# Patient Record
Sex: FEMALE | Race: WHITE
[De-identification: names, ages, dates, MRNs, and addresses within clinical notes are randomized per-mention and may not be internally consistent; named-entity substitution may affect disease eponyms.]

---

## 2017-01-31 ENCOUNTER — HOSPITAL ENCOUNTER (INPATIENT)
Dept: HOSPITAL 10 - OBT | Age: 32
LOS: 5 days | Discharge: HOME | End: 2017-02-05
Attending: OBSTETRICS & GYNECOLOGY | Admitting: OBSTETRICS & GYNECOLOGY
Payer: COMMERCIAL

## 2017-01-31 VITALS
HEIGHT: 63 IN | WEIGHT: 165.57 LBS | BODY MASS INDEX: 29.34 KG/M2 | BODY MASS INDEX: 29.34 KG/M2 | WEIGHT: 165.57 LBS | HEIGHT: 63 IN

## 2017-01-31 VITALS — HEART RATE: 86 BPM | RESPIRATION RATE: 18 BRPM | DIASTOLIC BLOOD PRESSURE: 60 MMHG | SYSTOLIC BLOOD PRESSURE: 109 MMHG

## 2017-01-31 DIAGNOSIS — Z3A.37: ICD-10-CM

## 2017-01-31 DIAGNOSIS — O36.5930: Primary | ICD-10-CM

## 2017-01-31 LAB
ADD UMIC: NO
ALBUMIN SERPL-MCNC: 3 G/DL (ref 3.3–4.9)
ALBUMIN/GLOB SERPL: 0.88 {RATIO}
ALP SERPL-CCNC: 153 IU/L (ref 42–121)
ALT SERPL-CCNC: 17 IU/L (ref 13–69)
ANION GAP SERPL CALC-SCNC: 13 MMOL/L (ref 8–16)
APTT BLD: 26.3 SEC (ref 25–35)
AST SERPL-CCNC: 16 IU/L (ref 15–46)
BARBITURATES UR-MCNC: NEGATIVE UG/ML
BASOPHILS # BLD AUTO: 0 10^3/UL (ref 0–0.1)
BASOPHILS NFR BLD: 0.3 % (ref 0–2)
BENZODIAZ UR-MCNC: NEGATIVE UG/L
BILIRUB DIRECT SERPL-MCNC: 0 MG/DL (ref 0–0.2)
BILIRUB SERPL-MCNC: 0.5 MG/DL (ref 0.2–1.3)
BUN SERPL-MCNC: 9 MG/DL (ref 7–20)
CALCIUM SERPL-MCNC: 9.1 MG/DL (ref 8.4–10.2)
CANNABINOIDS UR-MCNC: NEGATIVE UG/L
CHLORIDE SERPL-SCNC: 105 MMOL/L (ref 97–110)
CO2 SERPL-SCNC: 24 MMOL/L (ref 21–31)
COCAINE UR-MCNC: NEGATIVE NG/ML
COLOR UR: (no result)
CONDITION: 1
CREAT SERPL-MCNC: 0.52 MG/DL (ref 0.44–1)
EOSINOPHIL # BLD: 0.1 10^3/UL (ref 0–0.5)
EOSINOPHIL NFR BLD: 1.3 % (ref 0–7)
ERYTHROCYTE [DISTWIDTH] IN BLOOD BY AUTOMATED COUNT: 13.3 % (ref 11.5–14.5)
GLOBULIN SER-MCNC: 3.4 G/DL (ref 1.3–3.2)
GLUCOSE SERPL-MCNC: 108 MG/DL (ref 70–220)
GLUCOSE UR STRIP-MCNC: NEGATIVE %
HCT VFR BLD CALC: 33 % (ref 37–47)
HGB BLD-MCNC: 11.1 G/DL (ref 12–16)
INR PPP: 0.92
KETONES UR STRIP.AUTO-MCNC: NEGATIVE MG/DL
LYMPHOCYTES # BLD AUTO: 1.7 10^3/UL (ref 0.8–2.9)
LYMPHOCYTES NFR BLD AUTO: 16.4 % (ref 15–51)
MCH RBC QN AUTO: 30.5 PG (ref 29–33)
MCHC RBC AUTO-ENTMCNC: 33.7 G/DL (ref 32–37)
MCV RBC AUTO: 90.6 FL (ref 82–101)
MONOCYTES # BLD: 0.6 10^3/UL (ref 0.3–0.9)
MONOCYTES NFR BLD: 5.4 % (ref 0–11)
NEUTROPHILS # BLD: 8.1 10^3/UL (ref 1.6–7.5)
NEUTROPHILS NFR BLD AUTO: 76.6 % (ref 39–77)
NITRITE UR QL STRIP.AUTO: NEGATIVE
NRBC # BLD MANUAL: 0 10^3/UL (ref 0–0)
NRBC BLD QL: 0 /100WBC (ref 0–0)
OPIATES UR-MCNC: NEGATIVE NG/ML
PLATELET # BLD: 196 10^3/UL (ref 140–440)
PMV BLD AUTO: 10.1 FL (ref 7.4–10.4)
POTASSIUM SERPL-SCNC: 3.6 MMOL/L (ref 3.5–5.1)
PROT SERPL-MCNC: 6.4 G/DL (ref 6.1–8.1)
PROTHROMBIN TIME: 12.4 SEC (ref 12.2–14.2)
PT RATIO: 1
RBC # BLD AUTO: 3.64 10^6/UL (ref 4.2–5.4)
RBC # UR AUTO: NEGATIVE /UL
SODIUM SERPL-SCNC: 138 MMOL/L (ref 135–144)
URATE SERPL-MCNC: 5.5 MG/DL (ref 3.1–7.9)
URINE BILIRUBIN (DIP): NEGATIVE
URINE TOTAL PROTEIN (DIP): NEGATIVE
UROBILINOGEN UR STRIP-ACNC: (no result) (ref 0.1–1)
WBC # BLD AUTO: 10.6 10^3/UL (ref 4.8–10.8)
WBC # BLD: 10.6 10^3/UL (ref 4.8–10.8)
WBC # UR STRIP: NEGATIVE /UL

## 2017-01-31 PROCEDURE — 85610 PROTHROMBIN TIME: CPT

## 2017-01-31 PROCEDURE — 85730 THROMBOPLASTIN TIME PARTIAL: CPT

## 2017-01-31 PROCEDURE — 62319: CPT

## 2017-01-31 PROCEDURE — 86901 BLOOD TYPING SEROLOGIC RH(D): CPT

## 2017-01-31 PROCEDURE — 87340 HEPATITIS B SURFACE AG IA: CPT

## 2017-01-31 PROCEDURE — G0463 HOSPITAL OUTPT CLINIC VISIT: HCPCS

## 2017-01-31 PROCEDURE — 84560 ASSAY OF URINE/URIC ACID: CPT

## 2017-01-31 PROCEDURE — 90715 TDAP VACCINE 7 YRS/> IM: CPT

## 2017-01-31 PROCEDURE — 76818 FETAL BIOPHYS PROFILE W/NST: CPT

## 2017-01-31 PROCEDURE — 36415 COLL VENOUS BLD VENIPUNCTURE: CPT

## 2017-01-31 PROCEDURE — 81003 URINALYSIS AUTO W/O SCOPE: CPT

## 2017-01-31 PROCEDURE — 86900 BLOOD TYPING SEROLOGIC ABO: CPT

## 2017-01-31 PROCEDURE — 80307 DRUG TEST PRSMV CHEM ANLYZR: CPT

## 2017-01-31 PROCEDURE — 86592 SYPHILIS TEST NON-TREP QUAL: CPT

## 2017-01-31 PROCEDURE — 76815 OB US LIMITED FETUS(S): CPT

## 2017-01-31 PROCEDURE — 80053 COMPREHEN METABOLIC PANEL: CPT

## 2017-01-31 PROCEDURE — 85025 COMPLETE CBC W/AUTO DIFF WBC: CPT

## 2017-01-31 RX ADMIN — AMPICILLIN SODIUM SCH MLS/HR: 1 INJECTION, POWDER, FOR SOLUTION INTRAMUSCULAR; INTRAVENOUS at 21:01

## 2017-01-31 RX ADMIN — PYRIDOXINE HYDROCHLORIDE SCH MLS/HR: 100 INJECTION, SOLUTION INTRAMUSCULAR; INTRAVENOUS at 17:42

## 2017-01-31 RX ADMIN — PYRIDOXINE HYDROCHLORIDE SCH MLS/HR: 100 INJECTION, SOLUTION INTRAMUSCULAR; INTRAVENOUS at 22:42

## 2017-01-31 NOTE — RADRPT
PROCEDURE:   US OB biophysical profile. 

 

CLINICAL INDICATION:    decreased fetal movements, PIH 

 

TECHNIQUE:   Multiple sonographic images of the pelvis were obtained.  The images were reviewed on a
 PACS workstation. 

 

COMPARISON:   None

 

FINDINGS:

 

There is a single viable intrauterine gestation.  Cardiac activity is present with 144 beats per min
[place] Dehe. 

There is a vertex presentation. 

The placenta is anterior.   There is no evidence of placental abruption.

There is a decreased amount of amniotic fluid with an SHAYNA = 6.1 cm.

 

Biophysical profile:

Fetal movement 2/2

Fetal tone 2/2.

Fetal breathing 2/2 

SHAYNA 2/2

 

Total 8/8 

 

RPTAT: AA . 

 

IMPRESSION:

Normal biophysical profile.  

Mild oligohydramnios. .

_____________________________________________ 

.Yamil Snowden MD, MD           Date    Time 

Electronically viewed and signed by .Yamil Snowden MD, MD on 01/31/2017 15:58 

 

D:  01/31/2017 15:58  T:  01/31/2017 15:58

.S/

## 2017-01-31 NOTE — RADRPT
PROCEDURE:   US OB. 

 

CLINICAL INDICATION:    Size and dates  , PIH

 

TECHNIQUE:   Multiple sonographic images of the pelvis and gravid uterus were obtained.  The images 
were reviewed on a PACS workstation. 

 

COMPARISON:   No prior studies are available for comparison. 

 

FINDINGS:

 

There is a single viable intrauterine gestation.  Cardiac activity is present with 142 beats per min
lenin. 

There is a vertex presentation. 

The placenta is anterior. There is no evidence for an abruption or placenta previa.

 

 

Measurements were made in order to determine fetal age. The results are as follows:

BPD =8.3 cm

HC =31.1 cm

AC =30.1 cm

FL =7.0 cm

Estimated gestational age of approximately 34 weeks and 4 days based on ultrasound measurements.  

 

Clinical age:  37 weeks and 0 days.  

 

The estimated date of delivery is 3/10/17, based on ultrasound measurements.  

 

The EFW = 2458 g, 6.9%, based on LMP age.

 

RPTAT: AA

 

 

IMPRESSION:

Single viable intrauterine gestation of approximately  34 weeks and 4 days based on ultrasound measu
rements.

Smaller than clinical age by 2.5 weeks.

_____________________________________________ 

.Yamil Snowden MD, MD           Date    Time 

Electronically viewed and signed by .Yamil Snowden MD, MD on 01/31/2017 15:59 

 

D:  01/31/2017 15:59  T:  01/31/2017 15:59

.S/

## 2017-02-01 VITALS — HEART RATE: 76 BPM | SYSTOLIC BLOOD PRESSURE: 103 MMHG | DIASTOLIC BLOOD PRESSURE: 68 MMHG

## 2017-02-01 VITALS — HEART RATE: 77 BPM | DIASTOLIC BLOOD PRESSURE: 61 MMHG | SYSTOLIC BLOOD PRESSURE: 103 MMHG

## 2017-02-01 RX ADMIN — LEVOTHYROXINE SODIUM SCH MCG: 75 TABLET ORAL at 05:58

## 2017-02-01 RX ADMIN — AMPICILLIN SODIUM SCH MLS/HR: 1 INJECTION, POWDER, FOR SOLUTION INTRAMUSCULAR; INTRAVENOUS at 14:13

## 2017-02-01 RX ADMIN — AMPICILLIN SODIUM SCH MLS/HR: 1 INJECTION, POWDER, FOR SOLUTION INTRAMUSCULAR; INTRAVENOUS at 17:59

## 2017-02-01 RX ADMIN — AMPICILLIN SODIUM SCH MLS/HR: 1 INJECTION, POWDER, FOR SOLUTION INTRAMUSCULAR; INTRAVENOUS at 09:50

## 2017-02-01 RX ADMIN — PYRIDOXINE HYDROCHLORIDE SCH MLS/HR: 100 INJECTION, SOLUTION INTRAMUSCULAR; INTRAVENOUS at 14:13

## 2017-02-01 RX ADMIN — AMPICILLIN SODIUM SCH MLS/HR: 1 INJECTION, POWDER, FOR SOLUTION INTRAMUSCULAR; INTRAVENOUS at 01:01

## 2017-02-01 RX ADMIN — PYRIDOXINE HYDROCHLORIDE SCH MLS/HR: 100 INJECTION, SOLUTION INTRAMUSCULAR; INTRAVENOUS at 06:48

## 2017-02-01 RX ADMIN — AMPICILLIN SODIUM SCH MLS/HR: 1 INJECTION, POWDER, FOR SOLUTION INTRAMUSCULAR; INTRAVENOUS at 21:12

## 2017-02-01 RX ADMIN — AMPICILLIN SODIUM SCH MLS/HR: 1 INJECTION, POWDER, FOR SOLUTION INTRAMUSCULAR; INTRAVENOUS at 04:54

## 2017-02-02 RX ADMIN — AMPICILLIN SODIUM SCH MLS/HR: 1 INJECTION, POWDER, FOR SOLUTION INTRAMUSCULAR; INTRAVENOUS at 13:34

## 2017-02-02 RX ADMIN — Medication SCH MLS/HR: at 07:08

## 2017-02-02 RX ADMIN — AMPICILLIN SODIUM SCH MLS/HR: 1 INJECTION, POWDER, FOR SOLUTION INTRAMUSCULAR; INTRAVENOUS at 05:01

## 2017-02-02 RX ADMIN — PYRIDOXINE HYDROCHLORIDE SCH MLS/HR: 100 INJECTION, SOLUTION INTRAMUSCULAR; INTRAVENOUS at 00:25

## 2017-02-02 RX ADMIN — PYRIDOXINE HYDROCHLORIDE SCH MLS/HR: 100 INJECTION, SOLUTION INTRAMUSCULAR; INTRAVENOUS at 20:35

## 2017-02-02 RX ADMIN — AMPICILLIN SODIUM SCH MLS/HR: 1 INJECTION, POWDER, FOR SOLUTION INTRAMUSCULAR; INTRAVENOUS at 08:57

## 2017-02-02 RX ADMIN — AMPICILLIN SODIUM SCH MLS/HR: 1 INJECTION, POWDER, FOR SOLUTION INTRAMUSCULAR; INTRAVENOUS at 16:52

## 2017-02-02 RX ADMIN — AMPICILLIN SODIUM SCH MLS/HR: 1 INJECTION, POWDER, FOR SOLUTION INTRAMUSCULAR; INTRAVENOUS at 21:03

## 2017-02-02 RX ADMIN — PYRIDOXINE HYDROCHLORIDE SCH MLS/HR: 100 INJECTION, SOLUTION INTRAMUSCULAR; INTRAVENOUS at 09:39

## 2017-02-02 RX ADMIN — AMPICILLIN SODIUM SCH MLS/HR: 1 INJECTION, POWDER, FOR SOLUTION INTRAMUSCULAR; INTRAVENOUS at 01:11

## 2017-02-02 RX ADMIN — LEVOTHYROXINE SODIUM SCH MCG: 75 TABLET ORAL at 07:42

## 2017-02-03 VITALS — DIASTOLIC BLOOD PRESSURE: 61 MMHG | SYSTOLIC BLOOD PRESSURE: 109 MMHG | HEART RATE: 80 BPM | RESPIRATION RATE: 18 BRPM

## 2017-02-03 VITALS — DIASTOLIC BLOOD PRESSURE: 70 MMHG | RESPIRATION RATE: 16 BRPM | SYSTOLIC BLOOD PRESSURE: 111 MMHG | HEART RATE: 71 BPM

## 2017-02-03 VITALS — RESPIRATION RATE: 18 BRPM | SYSTOLIC BLOOD PRESSURE: 106 MMHG | DIASTOLIC BLOOD PRESSURE: 60 MMHG | HEART RATE: 78 BPM

## 2017-02-03 VITALS — RESPIRATION RATE: 16 BRPM | HEART RATE: 71 BPM | SYSTOLIC BLOOD PRESSURE: 111 MMHG | DIASTOLIC BLOOD PRESSURE: 70 MMHG

## 2017-02-03 VITALS — RESPIRATION RATE: 16 BRPM | DIASTOLIC BLOOD PRESSURE: 67 MMHG | HEART RATE: 71 BPM | SYSTOLIC BLOOD PRESSURE: 107 MMHG

## 2017-02-03 VITALS — SYSTOLIC BLOOD PRESSURE: 116 MMHG | DIASTOLIC BLOOD PRESSURE: 83 MMHG | RESPIRATION RATE: 16 BRPM | HEART RATE: 65 BPM

## 2017-02-03 PROCEDURE — 3E0P7GC INTRODUCTION OF OTHER THERAPEUTIC SUBSTANCE INTO FEMALE REPRODUCTIVE, VIA NATURAL OR ARTIFICIAL OPENING: ICD-10-PCS | Performed by: OBSTETRICS & GYNECOLOGY

## 2017-02-03 RX ADMIN — OXYCODONE HYDROCHLORIDE AND ASPIRIN PRN TAB: 4.8355; 325 TABLET ORAL at 08:16

## 2017-02-03 RX ADMIN — PYRIDOXINE HYDROCHLORIDE SCH MLS/HR: 100 INJECTION, SOLUTION INTRAMUSCULAR; INTRAVENOUS at 05:04

## 2017-02-03 RX ADMIN — DOCUSATE SODIUM AND SENNOSIDES SCH TAB: 8.6; 5 TABLET, FILM COATED ORAL at 08:15

## 2017-02-03 RX ADMIN — PYRIDOXINE HYDROCHLORIDE SCH MLS/HR: 100 INJECTION, SOLUTION INTRAMUSCULAR; INTRAVENOUS at 00:36

## 2017-02-03 RX ADMIN — IBUPROFEN SCH MG: 600 TABLET ORAL at 18:30

## 2017-02-03 RX ADMIN — DOCUSATE SODIUM AND SENNOSIDES SCH TAB: 8.6; 5 TABLET, FILM COATED ORAL at 21:15

## 2017-02-03 RX ADMIN — IBUPROFEN SCH MG: 600 TABLET ORAL at 12:04

## 2017-02-03 RX ADMIN — Medication SCH MLS/HR: at 02:10

## 2017-02-03 RX ADMIN — IBUPROFEN SCH MG: 600 TABLET ORAL at 05:50

## 2017-02-03 RX ADMIN — LEVOTHYROXINE SODIUM SCH MCG: 75 TABLET ORAL at 08:15

## 2017-02-03 RX ADMIN — PYRIDOXINE HYDROCHLORIDE SCH MLS/HR: 100 INJECTION, SOLUTION INTRAMUSCULAR; INTRAVENOUS at 10:16

## 2017-02-03 RX ADMIN — AMPICILLIN SODIUM SCH MLS/HR: 1 INJECTION, POWDER, FOR SOLUTION INTRAMUSCULAR; INTRAVENOUS at 01:26

## 2017-02-03 NOTE — HP
Date/Time of Note


Date/Time of Note


DATE: 2/3/17 


TIME: 17:25





OB - History


Hx of Present Pregnancy


Free Text/Dictation


This is a 31 years old  female  4 para 3 EDC  

admitted to [facility] at 37 weeks and 6 days for induction 

of labor due to suspected IUGR(fundal height 34 cm consistent with 34 weeks 

gestation) this patient has been under the care of [facility] and according to her prenatal record she has been noncompliant with her 

prenatal visits, testing& perinatology appointments


. Denies allergy to latex or any known medication


Background: Blood type O positive GBS unknown hepatitis B negative RPR negative 

HIV negative.


Drug/alcohol use, negative  cocaine/crack use negative marijuana negative


Medical history positive for kidney disease/UTI positive, depression mainly 

postpartum.


Physical exam: 5 feet 3 166 pounds temperature 98.8 pulse 78 respiration 20 

blood pressure 108/64


Head ears nose and throat negative


Neck, supple no thyromegaly


Lungs clear to P/a


Heart, normal saline nasal rhythm no murmur


Abdomen, fundal height 34 cm from symphysis pubis(small for gestational age) 

fetal heart tracing reassuring


Pelvic exam on admission cervical dilatation 0 effacement 50% vertex at -2 

station


Extremity no edema no varicosities


Impression intrauterine pregnancy at 37-1/2 week pregnancy suspected IUGR ,


plan induction of labor with Cervidil.


Biophysical profile, estimated fetal weight


Perinatology, neonatology consult


Estimated Due Date:  2017


:  4


Para:  3


Prenatal Care:  Limited Care


Ultrasounds:  Other (ultrasound at 14 weeks)


Obstetrical Complications:  Fetal Growth Restriction


Medical Complications:  None





Past Family/Social History


*


Past Medical, Surgical, Family and Obstetric Histories reviewed from prenatal 

chart.


Rubella:  immune


RPR/VDRL:  Negative


GBS Status:  Negative


HBsAG:  Unknown





OB  Admission Exam


Vital Signs


Vital Signs





 Vital Signs








  Date Time  Temp Pulse Resp B/P Pulse Ox O2 Delivery O2 Flow Rate FiO2


 


2/3/17 16:00 98.2 71 16 111/70  Room Air  











Physical Exam


HEENT:  WNL


Heart:  Rhythm Normal


Lungs:  Clear, Equal


Abdomen:  WNL


Extremities:  Normal


Reflexes:  Normal


Cervical Dilatation:  3cm


Effacement:  50%


Fetal Heart Rate:  130's


Accelerations:  Accelerations Present


Varibility:  Moderate


Contractions on Admission:  None











SUNNI SHI MD Feb 3, 2017 17:47

## 2017-02-04 VITALS — DIASTOLIC BLOOD PRESSURE: 57 MMHG | SYSTOLIC BLOOD PRESSURE: 91 MMHG | HEART RATE: 64 BPM

## 2017-02-04 VITALS — RESPIRATION RATE: 18 BRPM | HEART RATE: 63 BPM | SYSTOLIC BLOOD PRESSURE: 109 MMHG | DIASTOLIC BLOOD PRESSURE: 76 MMHG

## 2017-02-04 VITALS — DIASTOLIC BLOOD PRESSURE: 70 MMHG | SYSTOLIC BLOOD PRESSURE: 105 MMHG | RESPIRATION RATE: 18 BRPM | HEART RATE: 62 BPM

## 2017-02-04 VITALS — HEART RATE: 77 BPM | SYSTOLIC BLOOD PRESSURE: 111 MMHG | DIASTOLIC BLOOD PRESSURE: 67 MMHG | RESPIRATION RATE: 19 BRPM

## 2017-02-04 LAB
BASOPHILS # BLD AUTO: 0 10^3/UL (ref 0–0.1)
BASOPHILS NFR BLD: 0.4 % (ref 0–2)
EOSINOPHIL # BLD: 0.2 10^3/UL (ref 0–0.5)
EOSINOPHIL NFR BLD: 2.4 % (ref 0–7)
ERYTHROCYTE [DISTWIDTH] IN BLOOD BY AUTOMATED COUNT: 12.9 % (ref 11.5–14.5)
HCT VFR BLD CALC: 28.5 % (ref 37–47)
HGB BLD-MCNC: 9.8 G/DL (ref 12–16)
LYMPHOCYTES # BLD AUTO: 2.9 10^3/UL (ref 0.8–2.9)
LYMPHOCYTES NFR BLD AUTO: 31.3 % (ref 15–51)
MCH RBC QN AUTO: 30.4 PG (ref 29–33)
MCHC RBC AUTO-ENTMCNC: 34.4 G/DL (ref 32–37)
MCV RBC AUTO: 88.5 FL (ref 82–101)
MONOCYTES # BLD: 0.7 10^3/UL (ref 0.3–0.9)
MONOCYTES NFR BLD: 7.5 % (ref 0–11)
NEUTROPHILS # BLD: 5.4 10^3/UL (ref 1.6–7.5)
NEUTROPHILS NFR BLD AUTO: 58 % (ref 39–77)
NRBC # BLD MANUAL: 0 10^3/UL (ref 0–0)
NRBC BLD QL: 0 /100WBC (ref 0–0)
PLATELET # BLD: 165 10^3/UL (ref 140–440)
PMV BLD AUTO: 11.8 FL (ref 7.4–10.4)
RBC # BLD AUTO: 3.22 10^6/UL (ref 4.2–5.4)
WBC # BLD AUTO: 9.3 10^3/UL (ref 4.8–10.8)

## 2017-02-04 RX ADMIN — DOCUSATE SODIUM AND SENNOSIDES SCH TAB: 8.6; 5 TABLET, FILM COATED ORAL at 20:38

## 2017-02-04 RX ADMIN — LEVOTHYROXINE SODIUM SCH MCG: 75 TABLET ORAL at 06:03

## 2017-02-04 RX ADMIN — IBUPROFEN SCH MG: 600 TABLET ORAL at 00:00

## 2017-02-04 RX ADMIN — OXYCODONE HYDROCHLORIDE AND ASPIRIN PRN TAB: 4.8355; 325 TABLET ORAL at 16:47

## 2017-02-04 RX ADMIN — IBUPROFEN SCH MG: 600 TABLET ORAL at 17:51

## 2017-02-04 RX ADMIN — IBUPROFEN SCH MG: 600 TABLET ORAL at 12:00

## 2017-02-04 RX ADMIN — DOCUSATE SODIUM AND SENNOSIDES SCH TAB: 8.6; 5 TABLET, FILM COATED ORAL at 09:00

## 2017-02-04 RX ADMIN — IBUPROFEN SCH MG: 600 TABLET ORAL at 06:02

## 2017-02-04 NOTE — PN
Date/Time of Note


Date/Time of Note


DATE: 2/4/17 


TIME: 10:34





OB Subjective


Subjective


Subjective


Postpartum day 1


Afebrile vital sign a stable, abdomen soft uterus firm lochia normal limits he 

normal.


 Laboratory Tests








Test


  2/4/17


06:40


 


Basophils # 0.010^3/ul 


 


Basophils % 0.4% 


 


Eosinophils # 0.210^3/ul 


 


Eosinophils % 2.4% 


 


Hematocrit 28.5% 


 


Hemoglobin 9.8g/dl 


 


Lymphocytes # 2.910^3/ul 


 


Lymphocytes % 31.3% 


 


Mean Corpuscular Hemoglobin 30.4pg 


 


Mean Corpuscular Hemoglobin


Concent 34.4g/dl 


 


 


Mean Corpuscular Volume 88.5fl 


 


Mean Platelet Volume 11.8fl 


 


Monocytes # 0.710^3/ul 


 


Monocytes % 7.5% 


 


Neutrophils # 5.410^3/ul 


 


Neutrophils % 58.0% 


 


Nucleated Red Blood Cells # 0.010^3/ul 


 


Nucleated Red Blood Cells % 0.0/100WBC 


 


Platelet Count 89691^3/UL 


 


Red Blood Count 3.2210^6/ul 


 


Red Cell Distribution Width 12.9% 


 


White Blood Count 9.310^3/ul 








 Current Medications








 Medications


  (Trade)  Dose


 Ordered  Sig/Tuan


 Route


 PRN Reason  Start Time


 Stop Time Status Last Admin


Dose Admin


 


 Lactated Ringer's  1,000 ml @ 


 125 mls/hr  Q8H


 IV


   1/31/17 16:54


 2/3/17 05:04 DC 2/3/17 00:36


 


 


 Ampicillin  100 ml @ 


 100 mls/hr  ONCE  ONCE


 IV


   1/31/17 17:00


 1/31/17 17:59 DC 1/31/17 17:46


 


 


 Ampicillin


  (Ampicillin 1 Gm/


 NS (Pmx))  50 ml @ 


 100 mls/hr  Q4H


 IV


   1/31/17 21:00


 2/3/17 05:04 DC 2/3/17 01:26


 


 


 Dinoprostone


  (Cervidil


 Vaginal Supp)  10 mg  ONCE  ONCE


 VAG


   1/31/17 17:00


 1/31/17 17:21 DC  


 


 


 Butorphanol


 Tartrate


  (Stadol)  1 mg  Q2H  PRN


 IV


 PAIN  1/31/17 17:00


 2/3/17 05:04 DC  


 


 


 Butorphanol


 Tartrate


  (Stadol)  2 mg  Q2H  PRN


 IV


 PAIN  1/31/17 17:00


 2/3/17 05:05 DC  


 


 


 Lidocaine


  (Xylocaine 1%


  (Mpf))  30 ml  ONCE PRN


 INJ


 EPISIOTOMY/TEARING  1/31/17 17:00


 2/3/17 05:04 DC  


 


 


 Ibuprofen


  (Motrin)  600 mg  ONCE PRN


 PO


 Mild Pain (Pain Score 1-3)  1/31/17 17:00


 2/3/17 05:05 DC  


 


 


 Acetaminophen/


 Codeine Phosphate


 2 tab  2 tab  ONCE PRN


 PO


 Moderate to Severe Pain (4-10)  1/31/17 17:00


 2/3/17 05:05 DC  


 


 


 Lactated Ringer's  1,000 ml @ 


 2,000 mls/hr  Q30M PRN


 IV


 PRE-EPIDURAL BOLUS  1/31/17 17:00


 2/3/17 05:05 DC  


 


 


 Oxytocin/Lactated


 Ringer's  500 ml @ 0


 mls/hr  ONCE PRN


 IV


 For Hemorrhage Management  1/31/17 17:00


 2/3/17 05:04 DC  


 


 


 Methylergonovine


 Maleate


  (Methergine)  0.2 mg  ONCE PRN


 IM


 VAGINAL BLEEDING  1/31/17 17:00


 2/3/17 05:05 DC  


 


 


 Carboprost


 Tromethamine


  (Hemabate)  250 mcg  ONCE PRN


 IM


 VAGINAL BLEEDING  1/31/17 17:00


 2/3/17 05:05 DC  


 


 


 Misoprostol


  (Cytotec)  1,000 mcg  ONCE PRN


 FL


 VAGINAL BLEEDING  1/31/17 17:00


 2/3/17 05:04 DC  


 


 


 Levothyroxine


 Sodium


  (Synthroid)  225 mcg  DAILY@06


 PO


   2/1/17 06:00


 2/3/17 05:04 DC 2/2/17 07:42


 


 


 Dinoprostone 10 mg  10 mg  ONCE  ONCE


 VAG


   2/1/17 10:00


 2/1/17 10:01 DC 2/1/17 10:18


 


 


 Oxytocin/Lactated


 Ringer's  500 ml @ 0


 mls/hr  Q0M


 IV


   2/2/17 06:50


 2/3/17 05:04 DC 2/3/17 02:10


 


 


 Dinoprostone 10 mg  10 mg  ONCE  ONCE


 VAG


   2/2/17 13:30


 2/2/17 13:31 DC 2/2/17 13:35


 


 


 Fentanyl/


 Ropivacaine  100 ml @   STK-MED ONCE


 .ROUTE


   2/3/17 00:51


 2/3/17 00:52 DC  


 


 


 Oxytocin/Lactated


 Ringer's  500 ml @ 


 125 mls/hr  Q4H


 IV


   2/3/17 05:04


 2/3/17 10:44 DC 2/3/17 05:59


 


 


 Lactated Ringer's


  (Lr)  1,000 ml @ 


 125 mls/hr  Q8H


 IV*


   2/3/17 05:04


 2/3/17 13:00 DC 2/3/17 10:16


 


 


 Ibuprofen


  (Motrin)  600 mg  Q6


 PO


   2/3/17 06:00


    2/4/17 06:02


 


 


 Acetaminophen


  (Tylenol Tab)  650 mg  Q4H  PRN


 PO


 PAIN LEVEL 1-5  2/3/17 05:30


     


 


 


 Acetaminophen/


 Codeine Phosphate


  (Tylenol No.3)  1 tab  Q4H  PRN


 PO


 PAIN LEVEL 1-5  2/3/17 05:30


     


 


 


 Acetaminophen/


 Codeine Phosphate


  (Tylenol No.3)  2 tab  Q4H  PRN


 PO


 PAIN LEVEL 6-10  2/3/17 05:30


     


 


 


 Oxycodone/Aspirin


  (Percodan)  1 tab  Q3H  PRN


 PO


 PAIN LEVEL 1-5  2/3/17 05:30


    2/3/17 08:16


 


 


 Zolpidem Tartrate


  (Ambien)  5 mg  QHS  PRN


 PO


 INSOMNIA  2/3/17 05:30


     


 


 


 Senna/Docusate


 Sodium


  (Senokot-S)  1 tab  BID


 PO


   2/3/17 09:00


    2/3/17 21:15


 


 


 Witch Hazel/


 Glycerin


  (Tucks Pads)  1 pad  BEDSIDE MEDICATION  PRN


 FL


 HEMORRHOID/EPISIOTMY PAIN  2/3/17 05:30


    2/3/17 08:23


 


 


 Benzocaine


  (Dermoplast


 Spray)  1 spray  BEDSIDE MEDICATION  PRN


 TOP


 HEMORRHOID/EPISIOTMY PAIN  2/3/17 05:30


    2/3/17 08:22


 


 


 Lanolin


  (John-O-Soothe)  1 applic  BEDSIDE MEDICATION  PRN


 TOP


 BEDSIDE FOR LUCIUS TO NIPPLES  2/3/17 05:30


     


 


 


 Diphtheria/


 Tetanus/Acell


 Pertussis 0.5 ml  0.5 ml  ONCE ONCE


 IM*


   2/5/17 09:00


 2/5/17 09:01   


 


 


 Oxytocin/Lactated


 Ringer's  500 ml @ 0


 mls/hr  ONCE PRN


 IV


 For Hemorrhage Management  2/3/17 05:30


     


 


 


 Methylergonovine


 Maleate


  (Methergine)  0.2 mg  ONCE PRN


 IM


 VAGINAL BLEEDING  2/3/17 05:30


     


 


 


 Carboprost


 Tromethamine


  (Hemabate)  250 mcg  ONCE PRN


 IM


 VAGINAL BLEEDING  2/3/17 05:30


     


 


 


 Misoprostol


  (Cytotec)  1,000 mcg  ONCE PRN


 FL


 VAGINAL BLEEDING  2/3/17 05:30


     


 


 


 Levothyroxine


 Sodium


  (Synthroid)  225 mcg  DAILY@06


 PO


   2/3/17 06:00


    2/4/17 06:03


 

















SUNNI SHI MD Feb 4, 2017 10:35

## 2017-02-05 VITALS — SYSTOLIC BLOOD PRESSURE: 108 MMHG | HEART RATE: 59 BPM | RESPIRATION RATE: 14 BRPM | DIASTOLIC BLOOD PRESSURE: 71 MMHG

## 2017-02-05 VITALS — DIASTOLIC BLOOD PRESSURE: 59 MMHG | HEART RATE: 74 BPM | RESPIRATION RATE: 18 BRPM | SYSTOLIC BLOOD PRESSURE: 114 MMHG

## 2017-02-05 VITALS — DIASTOLIC BLOOD PRESSURE: 84 MMHG | RESPIRATION RATE: 16 BRPM | HEART RATE: 64 BPM | SYSTOLIC BLOOD PRESSURE: 132 MMHG

## 2017-02-05 RX ADMIN — IBUPROFEN SCH MG: 600 TABLET ORAL at 00:14

## 2017-02-05 RX ADMIN — LEVOTHYROXINE SODIUM SCH MCG: 75 TABLET ORAL at 05:36

## 2017-02-05 RX ADMIN — IBUPROFEN SCH MG: 600 TABLET ORAL at 12:00

## 2017-02-05 RX ADMIN — IBUPROFEN SCH MG: 600 TABLET ORAL at 05:36

## 2017-02-05 RX ADMIN — DOCUSATE SODIUM AND SENNOSIDES SCH TAB: 8.6; 5 TABLET, FILM COATED ORAL at 09:00

## 2017-02-05 NOTE — DS
Date/Time of Note


Date/Time of Note


DATE: 2/5/17 


TIME: 12:35





Obstetrical Discharge Record


Final Diagnosis


Final Diagnosis:  Term delivered





Vaginal Delivery


Obstetrical Delivery:  Spontaneous





Condition on Discharge


Physical Assessment


Last Vitals:


Vital sign stable, afebrile, abdomen soft, uterus firm, lochia normal, 

extremity normal, patient discharged home with postpartum instruction 

recommended appointment in 2 weeks


Voiding:  Yes


Bowel Movement:  Yes


Fundus:  Firm


Calf Tenderness:  No


Patient Condition:  Good











SUNNI SHI MD Feb 5, 2017 12:38

## 2017-02-05 NOTE — PD.PPDC
OB/GYN Discharge Instruction


Condition


Patient Condition:  Good





Diet


Diet:  Resume Regular Diet





Activity/Restrictions








Activity:   Normal Activity





 May Shower














Restrictions:   No Exercising





 No Lifting





 No Driving





 No Sexual Activity





 Nothing in the Vagina





 No Isle





 No Tampons, douche











Wound/Drain Care Instructions








Wound/Drain Care Instructions:   Wash with soap and water





 Keep clean and dry











Follow-up


Follow-up with Physician:  2





Return to clinic for








GYN Instructions:   Fever greater than 101





 Worsening abdominal pain





 Excessive Vaginal Bleeding





 More than 2 pads per hour





 Unable to tolerate diet

















SUNNI SHI MD Feb 5, 2017 12:35

## 2019-03-21 ENCOUNTER — HOSPITAL ENCOUNTER (EMERGENCY)
Dept: HOSPITAL 91 - FTE | Age: 34
Discharge: HOME | End: 2019-03-21
Payer: MEDICAID

## 2019-03-21 DIAGNOSIS — R05: Primary | ICD-10-CM

## 2019-03-21 DIAGNOSIS — R50.9: ICD-10-CM

## 2019-03-21 PROCEDURE — 99283 EMERGENCY DEPT VISIT LOW MDM: CPT

## 2019-03-21 NOTE — ERD
ER Documentation


Chief Complaint


Chief Complaint





Complains of cough, colds and flu symptoms x 3 days





ROS


All systems reviewed and are negative except as per history of present illness.





Medications


Home Meds


Active Scripts


Oseltamivir Phosphate* (Tamiflu*) 75 Mg Capsule, 75 MG PO BID for flu for 5 


Days, #10 CAP


   Prov:RED CARDENAS DO         3/21/19


Guaifenesin* (Robitussin*) 100 Mg/5 Ml Syrup, 200 MG PO Q6H PRN for COUGH for 7 


Days, #1 BOTTLE


   Prov:RED CARDENAS DO         3/21/19


Acetaminophen* (Acetaminophen*) 500 MG Extra Strength Tablet, 500 MG PO Q4H PRN 


for PAIN AND OR ELEVATED TEMP, #30 TAB


   Prov:RED CARDENAS DO         3/21/19


Famotidine* (Pepcid*) 20 Mg Tablet, 20 MG PO BID for 10 Days, #20 TAB


   Prov:ESME,KYUNG         8/29/18


Naproxen* (Naprosyn*) 500 Mg Tablet, 500 MG PO BID PRN for PAIN AND/OR 


INFLAMMATION, #20 TAB


   Prov:ESME,KYUNG         8/29/18


Reported Medications


Ferrous Sulfate (Iron) 134 Mg Tablet, 134 MG PO DAILY, TAB


   12/9/16


Prenatal Vit W-Ca,Fe,FA(<1 mg) (Prenatal Vitamins) 1 Each Tablet, 1 EACH PO 


DAILY, TAB


   12/9/16


Levothyroxine Sodium* (Synthroid*) 200 Mcg Tablet, 225 MCG PO BEFORE BREAKFAST, 


#30 TAB


   12/9/16





Allergies


Allergies:  


Coded Allergies:  


     No Known Allergy (Verified , 1/31/17)





PMhx/Soc


History of Surgery:  No


Anesthesia Reaction:  No


Hx Neurological Disorder:  No


Hx Respiratory Disorders:  No


Hx Cardiac Disorders:  No


Hx Psychiatric Problems:  No


Hx Miscellaneous Medical Probl:  Yes (pituatary tumor, thyroid)


Hx Alcohol Use:  Yes


Hx Substance Use:  No


Hx Tobacco Use:  No





Physical Exam


Vitals





Vital Signs


  Date      Temp  Pulse  Resp  B/P (MAP)   Pulse Ox  O2          O2 Flow    FiO2


Time                                                 Delivery    Rate


   3/21/19  98.5    102    20      110/58        95


     13:27                           (75)





Physical Exam


Const:   No acute distress


Head:   Atraumatic 


Eyes:    Normal Conjunctiva


ENT:    Normal External Ears, Nose and Mouth.


Neck:               Full range of motion. No meningismus.


Resp:   Clear to auscultation bilaterally


Cardio:   Regular rate and rhythm, no murmurs


Abd:    Soft, non tender, non distended. Normal bowel sounds


Skin:   No petechiae or rashes


Back:   No midline or flank tenderness


Ext:    No cyanosis, or edema


Neur:   Awake and alert


Psych:    Normal Mood and Affect





Departure


Diagnosis:  


   Primary Impression:  


   Cough


   Additional Impression:  


   Fever


Condition:  Fair


Patient Instructions:  Fever Control (Adult)





Additional Instructions:  


Call your primary care doctor TOMORROW for an appointment during the next 1-2 


days.See the doctor sooner or return here if your condition worsens before your 


appointment time.











RED CARDENAS DO                 Mar 21, 2019 16:08

## 2019-08-30 ENCOUNTER — HOSPITAL ENCOUNTER (OUTPATIENT)
Dept: HOSPITAL 91 - OBT | Age: 34
Discharge: HOME | End: 2019-08-30
Payer: COMMERCIAL

## 2019-08-30 DIAGNOSIS — O36.5930: Primary | ICD-10-CM

## 2019-08-30 DIAGNOSIS — Z3A.37: ICD-10-CM

## 2019-08-30 PROCEDURE — 76820 UMBILICAL ARTERY ECHO: CPT

## 2019-08-30 PROCEDURE — 76818 FETAL BIOPHYS PROFILE W/NST: CPT

## 2019-09-02 ENCOUNTER — HOSPITAL ENCOUNTER (OUTPATIENT)
Dept: HOSPITAL 91 - OBT | Age: 34
Discharge: HOME | End: 2019-09-02
Payer: COMMERCIAL

## 2019-09-02 DIAGNOSIS — Z3A.39: ICD-10-CM

## 2019-09-02 DIAGNOSIS — O26.893: Primary | ICD-10-CM

## 2019-09-02 DIAGNOSIS — R51: ICD-10-CM

## 2019-09-02 DIAGNOSIS — R22.40: ICD-10-CM

## 2019-09-02 LAB
ADD UMIC: YES
FREE T4 (FREE THYROXINE): 0.89 NG/DL (ref 0.79–2.35)
FREE THYROXINE INDEX (CALC): 2.91 UG/ML (ref 0.65–3.89)
RUPTURE FETAL MEMBRANES: NEGATIVE
T3 UPTAKE: 23.1 % (ref 23.5–40.5)
T4 (THYROXINE): 12.6 UG/DL (ref 5.5–11)
THYROID STIMULATING HORMONE: 2.03 MIU/L (ref 0.47–4.68)
UR ASCORBIC ACID: NEGATIVE MG/DL
UR BILIRUBIN (DIP): NEGATIVE MG/DL
UR BLOOD (DIP): NEGATIVE MG/DL
UR CLARITY: CLEAR
UR COLOR: YELLOW
UR GLUCOSE (DIP): NEGATIVE MG/DL
UR KETONES (DIP): (no result) MG/DL
UR LEUKOCYTE ESTERASE (DIP): (no result) LEU/UL
UR NITRITE (DIP): NEGATIVE MG/DL
UR PH (DIP): 6 (ref 5–9)
UR RBC: 1 /HPF (ref 0–5)
UR SPECIFIC GRAVITY (DIP): 1.01 (ref 1–1.03)
UR SQUAMOUS EPITHELIAL CELL: (no result) /HPF
UR TOTAL PROTEIN (DIP): NEGATIVE MG/DL
UR UROBILINOGEN (DIP): NEGATIVE MG/DL
UR WBC: 3 /HPF (ref 0–5)

## 2019-09-02 PROCEDURE — 84436 ASSAY OF TOTAL THYROXINE: CPT

## 2019-09-02 PROCEDURE — 84443 ASSAY THYROID STIM HORMONE: CPT

## 2019-09-02 PROCEDURE — 84439 ASSAY OF FREE THYROXINE: CPT

## 2019-09-02 PROCEDURE — 80307 DRUG TEST PRSMV CHEM ANLYZR: CPT

## 2019-09-02 PROCEDURE — 84479 ASSAY OF THYROID (T3 OR T4): CPT

## 2019-09-02 PROCEDURE — 84112 EVAL AMNIOTIC FLUID PROTEIN: CPT

## 2019-09-02 PROCEDURE — 76818 FETAL BIOPHYS PROFILE W/NST: CPT

## 2019-09-02 PROCEDURE — 81001 URINALYSIS AUTO W/SCOPE: CPT

## 2019-09-02 PROCEDURE — 76815 OB US LIMITED FETUS(S): CPT

## 2019-09-02 PROCEDURE — 76820 UMBILICAL ARTERY ECHO: CPT

## 2019-09-05 ENCOUNTER — HOSPITAL ENCOUNTER (INPATIENT)
Dept: HOSPITAL 91 - OBT | Age: 34
LOS: 4 days | Discharge: HOME | End: 2019-09-09
Payer: COMMERCIAL

## 2019-09-05 DIAGNOSIS — E03.9: ICD-10-CM

## 2019-09-05 DIAGNOSIS — Z3A.38: ICD-10-CM

## 2019-09-05 DIAGNOSIS — O36.5930: ICD-10-CM

## 2019-09-05 PROCEDURE — 80307 DRUG TEST PRSMV CHEM ANLYZR: CPT

## 2019-09-05 PROCEDURE — 76818 FETAL BIOPHYS PROFILE W/NST: CPT

## 2019-09-05 PROCEDURE — 87340 HEPATITIS B SURFACE AG IA: CPT

## 2019-09-05 PROCEDURE — 86592 SYPHILIS TEST NON-TREP QUAL: CPT

## 2019-09-05 PROCEDURE — 85610 PROTHROMBIN TIME: CPT

## 2019-09-05 PROCEDURE — 85025 COMPLETE CBC W/AUTO DIFF WBC: CPT

## 2019-09-05 PROCEDURE — 86850 RBC ANTIBODY SCREEN: CPT

## 2019-09-05 PROCEDURE — 76815 OB US LIMITED FETUS(S): CPT

## 2019-09-05 PROCEDURE — 86900 BLOOD TYPING SEROLOGIC ABO: CPT

## 2019-09-05 PROCEDURE — 85730 THROMBOPLASTIN TIME PARTIAL: CPT

## 2019-09-05 PROCEDURE — 86901 BLOOD TYPING SEROLOGIC RH(D): CPT

## 2019-09-06 LAB
ADD MAN DIFF?: NO
BASOPHIL #: 0 10^3/UL (ref 0–0.1)
BASOPHILS %: 0.2 % (ref 0–2)
EOSINOPHILS #: 0.1 10^3/UL (ref 0–0.5)
EOSINOPHILS %: 0.7 % (ref 0–7)
HEMATOCRIT: 30.8 % (ref 37–47)
HEMOGLOBIN: 10.4 G/DL (ref 12–16)
HEPATITIS B SURFACE ANTIGEN: NEGATIVE
IMMATURE GRANS #M: 0.03 10^3/UL (ref 0–0.03)
IMMATURE GRANS % (M): 0.3 % (ref 0–0.43)
INR: 0.92
LYMPHOCYTES #: 2.2 10^3/UL (ref 0.8–2.9)
LYMPHOCYTES %: 19.2 % (ref 15–51)
MEAN CORPUSCULAR HEMOGLOBIN: 31.6 PG (ref 29–33)
MEAN CORPUSCULAR HGB CONC: 33.8 G/DL (ref 32–37)
MEAN CORPUSCULAR VOLUME: 93.6 FL (ref 82–101)
MEAN PLATELET VOLUME: 11.5 FL (ref 7.4–10.4)
MONOCYTE #: 0.7 10^3/UL (ref 0.3–0.9)
MONOCYTES %: 6.2 % (ref 0–11)
NEUTROPHIL #: 8.3 10^3/UL (ref 1.6–7.5)
NEUTROPHILS %: 73.4 % (ref 39–77)
NUCLEATED RED BLOOD CELLS #: 0 10^3/UL (ref 0–0)
NUCLEATED RED BLOOD CELLS%: 0 /100WBC (ref 0–0)
PARTIAL THROMBOPLASTIN TIME: 26.5 SEC (ref 23–35)
PLATELET COUNT: 189 10^3/UL (ref 140–415)
PROTIME: 12.5 SEC (ref 11.9–14.9)
PT RATIO: 1
RAPID PLASMA REAGIN: NONREACTIVE
RED BLOOD COUNT: 3.29 10^6/UL (ref 4.2–5.4)
RED CELL DISTRIBUTION WIDTH: 12.5 % (ref 11.5–14.5)
WHITE BLOOD COUNT: 11.4 10^3/UL (ref 4.8–10.8)

## 2019-09-06 RX ADMIN — BUTORPHANOL TARTRATE 1 MG: 2 INJECTION, SOLUTION INTRAMUSCULAR; INTRAVENOUS at 21:38

## 2019-09-06 RX ADMIN — AMPICILLIN 1 MLS/HR: 1 INJECTION, POWDER, FOR SOLUTION INTRAMUSCULAR; INTRAVENOUS at 21:12

## 2019-09-06 RX ADMIN — LEVOTHYROXINE SODIUM 1 MCG: 125 TABLET ORAL at 08:45

## 2019-09-06 RX ADMIN — AMPICILLIN 1 MLS/HR: 2 INJECTION, POWDER, FOR SOLUTION INTRAVENOUS at 02:26

## 2019-09-06 RX ADMIN — AMPICILLIN 1 MLS/HR: 1 INJECTION, POWDER, FOR SOLUTION INTRAMUSCULAR; INTRAVENOUS at 05:30

## 2019-09-06 RX ADMIN — LEVOTHYROXINE SODIUM 1 MCG: 100 TABLET ORAL at 08:45

## 2019-09-06 RX ADMIN — PYRIDOXINE HYDROCHLORIDE 1 MLS/HR: 100 INJECTION, SOLUTION INTRAMUSCULAR; INTRAVENOUS at 20:44

## 2019-09-06 RX ADMIN — PYRIDOXINE HYDROCHLORIDE 1 MLS/HR: 100 INJECTION, SOLUTION INTRAMUSCULAR; INTRAVENOUS at 11:38

## 2019-09-06 RX ADMIN — Medication 1 MCG: at 13:02

## 2019-09-06 RX ADMIN — Medication 1 MCG: at 09:15

## 2019-09-06 RX ADMIN — AMPICILLIN 1 MLS/HR: 1 INJECTION, POWDER, FOR SOLUTION INTRAMUSCULAR; INTRAVENOUS at 17:01

## 2019-09-06 RX ADMIN — AMPICILLIN 1 MLS/HR: 1 INJECTION, POWDER, FOR SOLUTION INTRAMUSCULAR; INTRAVENOUS at 09:15

## 2019-09-06 RX ADMIN — AMPICILLIN 1 MLS/HR: 1 INJECTION, POWDER, FOR SOLUTION INTRAMUSCULAR; INTRAVENOUS at 12:57

## 2019-09-06 RX ADMIN — PYRIDOXINE HYDROCHLORIDE 1 MLS/HR: 100 INJECTION, SOLUTION INTRAMUSCULAR; INTRAVENOUS at 02:22

## 2019-09-06 RX ADMIN — Medication 1 MCG: at 17:01

## 2019-09-06 RX ADMIN — Medication 1 MCG: at 04:52

## 2019-09-07 LAB
ADD MAN DIFF?: NO
BASOPHIL #: 0 10^3/UL (ref 0–0.1)
BASOPHILS %: 0.2 % (ref 0–2)
EOSINOPHILS #: 0 10^3/UL (ref 0–0.5)
EOSINOPHILS %: 0.1 % (ref 0–7)
HEMATOCRIT: 30 % (ref 37–47)
HEMOGLOBIN: 10.3 G/DL (ref 12–16)
IMMATURE GRANS #M: 0.1 10^3/UL (ref 0–0.03)
IMMATURE GRANS % (M): 0.5 % (ref 0–0.43)
LYMPHOCYTES #: 1.6 10^3/UL (ref 0.8–2.9)
LYMPHOCYTES %: 8.6 % (ref 15–51)
MEAN CORPUSCULAR HEMOGLOBIN: 31.5 PG (ref 29–33)
MEAN CORPUSCULAR HGB CONC: 34.3 G/DL (ref 32–37)
MEAN CORPUSCULAR VOLUME: 91.7 FL (ref 82–101)
MEAN PLATELET VOLUME: 11.9 FL (ref 7.4–10.4)
MONOCYTE #: 0.9 10^3/UL (ref 0.3–0.9)
MONOCYTES %: 4.8 % (ref 0–11)
NEUTROPHIL #: 16.3 10^3/UL (ref 1.6–7.5)
NEUTROPHILS %: 85.8 % (ref 39–77)
NUCLEATED RED BLOOD CELLS #: 0 10^3/UL (ref 0–0)
NUCLEATED RED BLOOD CELLS%: 0 /100WBC (ref 0–0)
PLATELET COUNT: 173 10^3/UL (ref 140–415)
RED BLOOD COUNT: 3.27 10^6/UL (ref 4.2–5.4)
RED CELL DISTRIBUTION WIDTH: 12.5 % (ref 11.5–14.5)
WHITE BLOOD COUNT: 19 10^3/UL (ref 4.8–10.8)

## 2019-09-07 RX ADMIN — Medication 56 SPRAY: at 05:37

## 2019-09-07 RX ADMIN — IBUPROFEN 1 MG: 800 TABLET, FILM COATED ORAL at 05:37

## 2019-09-07 RX ADMIN — Medication 1 MLS/HR: at 01:26

## 2019-09-07 RX ADMIN — PYRIDOXINE HYDROCHLORIDE 1 MLS/HR: 100 INJECTION, SOLUTION INTRAMUSCULAR; INTRAVENOUS at 00:43

## 2019-09-07 RX ADMIN — Medication 1 MLS/HR: at 01:27

## 2019-09-07 RX ADMIN — PYRIDOXINE HYDROCHLORIDE 1 MLS/HR: 100 INJECTION, SOLUTION INTRAMUSCULAR; INTRAVENOUS at 08:43

## 2019-09-07 RX ADMIN — LEVOTHYROXINE SODIUM 1 MCG: 125 TABLET ORAL at 05:37

## 2019-09-07 RX ADMIN — METHYLERGONOVINE MALEATE 1 MG: 0.2 INJECTION, SOLUTION INTRAMUSCULAR; INTRAVENOUS at 00:23

## 2019-09-07 RX ADMIN — IBUPROFEN 1 MG: 600 TABLET ORAL at 01:15

## 2019-09-07 RX ADMIN — IBUPROFEN 1 MG: 800 TABLET, FILM COATED ORAL at 17:43

## 2019-09-07 RX ADMIN — WITCH HAZEL 40 PAD: 500 SOLUTION RECTAL; TOPICAL at 05:37

## 2019-09-07 RX ADMIN — Medication 1 MLS/HR: at 00:19

## 2019-09-07 RX ADMIN — IBUPROFEN 1 MG: 800 TABLET, FILM COATED ORAL at 23:39

## 2019-09-07 RX ADMIN — LEVOTHYROXINE SODIUM 1 MCG: 100 TABLET ORAL at 05:37

## 2019-09-07 RX ADMIN — IBUPROFEN 1 MG: 800 TABLET, FILM COATED ORAL at 12:00

## 2019-09-08 LAB
ADD MAN DIFF?: NO
BASOPHIL #: 0 10^3/UL (ref 0–0.1)
BASOPHILS %: 0.4 % (ref 0–2)
EOSINOPHILS #: 0.1 10^3/UL (ref 0–0.5)
EOSINOPHILS %: 0.9 % (ref 0–7)
HEMATOCRIT: 25.6 % (ref 37–47)
HEMOGLOBIN: 8.7 G/DL (ref 12–16)
IMMATURE GRANS #M: 0.04 10^3/UL (ref 0–0.03)
IMMATURE GRANS % (M): 0.4 % (ref 0–0.43)
LYMPHOCYTES #: 2.8 10^3/UL (ref 0.8–2.9)
LYMPHOCYTES %: 25.3 % (ref 15–51)
MEAN CORPUSCULAR HEMOGLOBIN: 31.8 PG (ref 29–33)
MEAN CORPUSCULAR HGB CONC: 34 G/DL (ref 32–37)
MEAN CORPUSCULAR VOLUME: 93.4 FL (ref 82–101)
MEAN PLATELET VOLUME: 11.6 FL (ref 7.4–10.4)
MONOCYTE #: 0.7 10^3/UL (ref 0.3–0.9)
MONOCYTES %: 6.2 % (ref 0–11)
NEUTROPHIL #: 7.3 10^3/UL (ref 1.6–7.5)
NEUTROPHILS %: 66.8 % (ref 39–77)
NUCLEATED RED BLOOD CELLS #: 0 10^3/UL (ref 0–0)
NUCLEATED RED BLOOD CELLS%: 0 /100WBC (ref 0–0)
PLATELET COUNT: 168 10^3/UL (ref 140–415)
RED BLOOD COUNT: 2.74 10^6/UL (ref 4.2–5.4)
RED CELL DISTRIBUTION WIDTH: 12.6 % (ref 11.5–14.5)
WHITE BLOOD COUNT: 10.9 10^3/UL (ref 4.8–10.8)

## 2019-09-08 RX ADMIN — LEVOTHYROXINE SODIUM 1 MCG: 100 TABLET ORAL at 05:59

## 2019-09-08 RX ADMIN — IBUPROFEN 1 MG: 800 TABLET, FILM COATED ORAL at 05:59

## 2019-09-08 RX ADMIN — IBUPROFEN 1 MG: 800 TABLET, FILM COATED ORAL at 11:29

## 2019-09-08 RX ADMIN — LEVOTHYROXINE SODIUM 1 MCG: 125 TABLET ORAL at 05:59

## 2019-09-08 RX ADMIN — Medication 1 APPLIC: at 11:29

## 2019-09-08 RX ADMIN — IBUPROFEN 1 MG: 800 TABLET, FILM COATED ORAL at 18:07

## 2019-09-09 PROCEDURE — 0HQ9XZZ REPAIR PERINEUM SKIN, EXTERNAL APPROACH: ICD-10-PCS

## 2019-09-09 RX ADMIN — IBUPROFEN 1 MG: 800 TABLET, FILM COATED ORAL at 12:17

## 2019-09-09 RX ADMIN — WITCH HAZEL 1 PAD: 500 SOLUTION RECTAL; TOPICAL at 00:16

## 2019-09-09 RX ADMIN — IBUPROFEN 1 MG: 800 TABLET, FILM COATED ORAL at 00:16

## 2019-09-09 RX ADMIN — IBUPROFEN 1 MG: 800 TABLET, FILM COATED ORAL at 05:46

## 2019-09-09 RX ADMIN — LEVOTHYROXINE SODIUM 1 MCG: 125 TABLET ORAL at 05:46

## 2019-09-09 RX ADMIN — LEVOTHYROXINE SODIUM 1 MCG: 100 TABLET ORAL at 05:46
